# Patient Record
Sex: FEMALE | Race: BLACK OR AFRICAN AMERICAN | NOT HISPANIC OR LATINO | Employment: UNEMPLOYED | URBAN - METROPOLITAN AREA
[De-identification: names, ages, dates, MRNs, and addresses within clinical notes are randomized per-mention and may not be internally consistent; named-entity substitution may affect disease eponyms.]

---

## 2017-04-03 ENCOUNTER — OFFICE VISIT (OUTPATIENT)
Dept: OBSTETRICS AND GYNECOLOGY | Facility: CLINIC | Age: 32
End: 2017-04-03
Payer: MEDICAID

## 2017-04-03 VITALS
BODY MASS INDEX: 20.24 KG/M2 | DIASTOLIC BLOOD PRESSURE: 76 MMHG | WEIGHT: 110 LBS | HEIGHT: 62 IN | SYSTOLIC BLOOD PRESSURE: 120 MMHG

## 2017-04-03 DIAGNOSIS — N64.4 MASTODYNIA OF LEFT BREAST: Primary | ICD-10-CM

## 2017-04-03 PROCEDURE — 99999 PR PBB SHADOW E&M-EST. PATIENT-LVL II: CPT | Mod: PBBFAC,,, | Performed by: OBSTETRICS & GYNECOLOGY

## 2017-04-03 PROCEDURE — 99214 OFFICE O/P EST MOD 30 MIN: CPT | Mod: S$PBB,,, | Performed by: OBSTETRICS & GYNECOLOGY

## 2017-04-03 PROCEDURE — 99212 OFFICE O/P EST SF 10 MIN: CPT | Mod: PBBFAC | Performed by: OBSTETRICS & GYNECOLOGY

## 2017-04-03 RX ORDER — CLOTRIMAZOLE AND BETAMETHASONE DIPROPIONATE 10; .64 MG/G; MG/G
CREAM TOPICAL
Qty: 45 G | Refills: 0 | Status: SHIPPED | OUTPATIENT
Start: 2017-04-03 | End: 2018-04-03

## 2017-04-10 NOTE — PROGRESS NOTES
Subjective:       Patient ID: Lauren Do is a 32 y.o. female.    Chief Complaint:  Breast Mass      History of Present Illness  HPI  Annual Exam-Premenopausal  Patient presents for annual exam. The patient is sexually active. GYN screening history: last pap: approximate date 2016 and was normal. The patient wears seatbelts: no. The patient participates in regular exercise: no. Has the patient ever been transfused or tattooed?: yes. The patient reports that there is not domestic violence in her life.      Pt c/o left breast pain.  Pt has not tried any medication for relief.  Pain has been present only for a few days      GYN & OB History  Patient's last menstrual period was 2017.   Date of Last Pap: 2016    OB History    Para Term  AB SAB TAB Ectopic Multiple Living   4 4 4      0 4      # Outcome Date GA Lbr Justin/2nd Weight Sex Delivery Anes PTL Lv   4 Term 02/10/15 37w3d  2.645 kg (5 lb 13.3 oz) M CS-LTranv Spinal,EPI  Y   3 Term 13 39w3d  3.657 kg (8 lb 1 oz) M CS-LTranv Spinal  Y   2 Term 10/22/08 39w0d  3.459 kg (7 lb 10 oz) M CS-LTranv Spinal N Y   1 Term 06 39w0d  3.43 kg (7 lb 9 oz) F CS-LTranv EPI N Y      Birth Comments: eclampsia           Review of Systems  Review of Systems   Constitutional: Negative.    Respiratory: Negative.    Cardiovascular: Negative.    Gastrointestinal: Negative.    Endocrine: Negative.    Genitourinary: Negative.    Musculoskeletal: Negative.    Hematological: Negative.    Psychiatric/Behavioral: Negative.    Breast: negative.            Objective:    Physical Exam:   Constitutional: She is oriented to person, place, and time. She appears well-developed and well-nourished.    HENT:   Head: Normocephalic and atraumatic.     Neck: Normal range of motion. No tracheal deviation present. No thyromegaly present.    Cardiovascular: Normal rate.     Pulmonary/Chest: Effort normal. No respiratory distress. Right breast exhibits no inverted  nipple, no mass, no nipple discharge, no skin change, no tenderness, presence, no bleeding and no swelling. Left breast exhibits tenderness. Left breast exhibits no inverted nipple, no mass, no nipple discharge, no skin change, presence, no bleeding and no swelling. Breasts are symmetrical.            Abdominal: Soft. She exhibits no distension, no mass and no abdominal incision. There is no tenderness. There is no rebound and no guarding.     Genitourinary: Uterus normal. Pelvic exam was performed with patient supine. There is no rash, tenderness, lesion or injury on the right labia. There is no rash, tenderness, lesion or injury on the left labia. Cervix is normal. Right adnexum displays no mass, no tenderness and no fullness. Left adnexum displays no mass, no tenderness and no fullness. Labial bartholins normal.          Musculoskeletal: Normal range of motion and moves all extremeties.       Neurological: She is alert and oriented to person, place, and time. No cranial nerve deficit. Coordination normal.     Psychiatric: She has a normal mood and affect. Her behavior is normal. Judgment and thought content normal.          Assessment:        1. Mastodynia of left breast     2.  Annual gyn exam        Plan:      1.  Discussed OTC NSAIDs to attempt pain relief.  No masses palpated and location of pain is most likely musculo-skeletal.  2.  Pap smear up to date.

## 2017-05-04 ENCOUNTER — OFFICE VISIT (OUTPATIENT)
Dept: OPTOMETRY | Facility: CLINIC | Age: 32
End: 2017-05-04
Payer: MEDICAID

## 2017-05-04 DIAGNOSIS — H57.11 EYE PAIN, RIGHT: Primary | ICD-10-CM

## 2017-05-04 DIAGNOSIS — Z13.5 SCREENING FOR EYE CONDITION: ICD-10-CM

## 2017-05-04 PROCEDURE — 92002 INTRM OPH EXAM NEW PATIENT: CPT | Mod: S$PBB,,, | Performed by: OPTOMETRIST

## 2017-05-04 PROCEDURE — 99999 PR PBB SHADOW E&M-EST. PATIENT-LVL II: CPT | Mod: PBBFAC,,, | Performed by: OPTOMETRIST

## 2017-05-04 PROCEDURE — 99212 OFFICE O/P EST SF 10 MIN: CPT | Mod: PBBFAC | Performed by: OPTOMETRIST

## 2017-05-04 NOTE — PROGRESS NOTES
HPI     Eye Problem    Additional comments: foreign body sensation in the right eye for   approximately one week.  Vision in that eye seems blurry, but seemed fine   prior to experiencing foreign body sensation in that eye.  No symptoms in   the left eye.            Comments   Patient is in stating she's been having a FB sensation in her OD for the   past week     (+) blurry vision  (+) floaters   (+) flashes of light        Last edited by Edgardo Nuñez, OD on 5/4/2017  3:16 PM. (History)            Assessment /Plan     For exam results, see Encounter Report.    1. Eye pain, right     2. Screening for eye condition                    Ms. Do in today with complaint of ocular discomfort/foreign body sensation in the right eye.   No foreign body noted beneath RUL  Irrigated right eye with RUL everted as precaution.  Ms. Do notes small bump on temporal aspect of RLL, but no bump felt on palpation, although she does report mild tenderness on palpation of temporal RLL.   Possible blocked meibomian gland?  Warm compresses to the temporal aspect of the RLL.  Suggest use of artificial tears (Systane Ultra) in the right eye as needed throughout the day for the next several days.  Return for no charge visit if symptoms not resolved in several days, or prior if any apparent exacerbation of symptoms in the interim.

## 2017-05-04 NOTE — PATIENT INSTRUCTIONS
Ms. Do in today with complaint of ocular discomfort/foreign body sensation in the right eye.   No foreign body noted beneath RUL  Irrigated right eye with RUL everted as precaution.  Ms. Do notes small bump on temporal aspect of RLL, but no bump felt on palpation, although she does report mild tenderness on palpation of temporal RLL.   Possible blocked meibomian gland?  Warm compresses to the temporal aspect of the RLL.  Suggest use of artificial tears (Systane Ultra) in the right eye as needed throughout the day for the next several days.  Return for no charge visit if symptoms not resolved in several days, or prior if any apparent exacerbation of symptoms in the interim.

## 2017-05-04 NOTE — MR AVS SNAPSHOT
[facility] - [facility]  [address]  [facility] [address]  Phone: [phone]  Fax: [phone]                  Lauren Do   2017 1:45 PM   Office Visit    Description:  Female : 1985   Provider:  Edgardo Nuñez OD   Department:  [facility]           Reason for Visit     Eye Problem                To Do List           Goals (5 Years of Data)     None      [facility]     [facility] On Call Nurse Care Line -  Assistance  Unless otherwise directed by your provider, please contact [facility], our nurse care line that is available for  assistance.     Registered nurses in the [facility] On Call Center provide: appointment scheduling, clinical advisement, health education, and other advisory services.  Call: [phone] (toll free)               Medications           Message regarding Medications     Verify the changes and/or additions to your medication regime listed below are the same as discussed with your clinician today.  If any of these changes or additions are incorrect, please notify your healthcare provider.             Verify that the below list of medications is an accurate representation of the medications you are currently taking.  If none reported, the list may be blank. If incorrect, please contact your healthcare provider. Carry this list with you in case of emergency.           Current Medications     clotrimazole-betamethasone 1-0.05% (LOTRISONE) cream Apply to affected area 2 times daily           Clinical Reference Information           Allergies as of 2017     No Known Allergies      Immunizations Administered on Date of Encounter - 2017     None      Instructions    Ms. Do in today with complaint of ocular discomfort/foreign body sensation in the right eye.   No foreign body noted beneath RUL  Irrigated right eye with RUL everted as precaution.  Ms. Do notes small bump on temporal aspect of RLL, but no bump felt on  palpation, although she does report mild tenderness on palpation of temporal RLL.   Possible blocked meibomian gland?  Warm compresses to the temporal aspect of the RLL.  Suggest use of artificial tears (Systane Ultra) in the right eye as needed throughout the day for the next several days.  Return for no charge visit if symptoms not resolved in several days, or prior if any apparent exacerbation of symptoms in the interim.        Language Assistance Services     ATTENTION: Language assistance services are available, free of charge. Please call [phone].      ATENCIÓN: Si priscillala colleen, tiene a pedro disposición servicios gratuitos de asistencia lingüística. Llame al [phone].     SERENITY Ý: N?u b?n nói Ti?ng Vi?t, có các d?ch v? h? tr? ngôn ng? mi?n phí dành cho b?n. G?i s? [phone].         [facility] complies with applicable Federal civil rights laws and does not discriminate on the basis of race, color, national origin, age, disability, or sex.

## 2017-10-11 ENCOUNTER — HOSPITAL ENCOUNTER (EMERGENCY)
Facility: OTHER | Age: 32
Discharge: LEFT BEFORE DISCHARGE | End: 2017-10-11
Attending: EMERGENCY MEDICINE
Payer: MEDICAID

## 2017-10-11 VITALS
OXYGEN SATURATION: 100 % | SYSTOLIC BLOOD PRESSURE: 113 MMHG | HEART RATE: 94 BPM | RESPIRATION RATE: 18 BRPM | TEMPERATURE: 98 F | DIASTOLIC BLOOD PRESSURE: 70 MMHG

## 2017-10-11 DIAGNOSIS — R10.9 ABDOMINAL PAIN, UNSPECIFIED ABDOMINAL LOCATION: Primary | ICD-10-CM

## 2017-10-11 LAB
B-HCG UR QL: NEGATIVE
BILIRUBIN, POC UA: NEGATIVE
BLOOD, POC UA: NEGATIVE
CLARITY, POC UA: ABNORMAL
COLOR, POC UA: YELLOW
CTP QC/QA: YES
GLUCOSE, POC UA: NEGATIVE
KETONES, POC UA: NEGATIVE
LEUKOCYTE EST, POC UA: NEGATIVE
NITRITE, POC UA: NEGATIVE
PH UR STRIP: 7 [PH]
PROTEIN, POC UA: NEGATIVE
SPECIFIC GRAVITY, POC UA: 1.02
UROBILINOGEN, POC UA: 1 E.U./DL

## 2017-10-11 PROCEDURE — 99283 EMERGENCY DEPT VISIT LOW MDM: CPT | Mod: 25

## 2017-10-11 PROCEDURE — 81025 URINE PREGNANCY TEST: CPT | Performed by: EMERGENCY MEDICINE

## 2017-10-11 PROCEDURE — 81003 URINALYSIS AUTO W/O SCOPE: CPT

## 2017-10-11 NOTE — ED NOTES
Pt presented to nurses station and stated she was leaving because no one saw her yet, pt informed 2 RNs and the ED tech has all seen her and she is waiting on MD assessment, pt did not answer this RN, stood in hallway for about two minutes texting on phone and then left facility

## 2017-10-11 NOTE — ED PROVIDER NOTES
Encounter Date: 10/11/2017       History     Chief Complaint   Patient presents with    Abdominal Pain     patient report having abdominal pain/cramping X2 days    Vomiting     patient pretport having N/V X1 day     Nausea     Pt left er after being placed into a room but before I could see her. Triage note and vitals reviewed. Unknown reason for departure.           Review of patient's allergies indicates:  No Known Allergies  Past Medical History:   Diagnosis Date    Anemia 2013     History of anemia during pregnancy    Asthma     childhood    Chronic kidney disease     History of kidney infection during 2 nd pregnancy    Hypertension 2006    Only with 1st pregnancy/eclamptic    Mental disorder 2010    hx of bipolar, anxiety    Seizures 2006    eclampsia     Sickle cell trait     Trauma     At 3 year old, fell and hit arm and head on a lamp.  Needed stitches.      Past Surgical History:   Procedure Laterality Date     SECTION      ,     TUBAL LIGATION       Family History   Problem Relation Age of Onset    Heart disease Mother      Heart Murmur at birth and 13 years old    Arthritis Mother     Asthma Mother 34     Bronchitis    Diabetes Sister     Heart disease Sister      Heart Murmur at birth    Hearing loss Sister     Diabetes Maternal Aunt     Birth defects Maternal Uncle     Mental retardation Maternal Uncle     Diabetes Maternal Grandmother     Heart disease Maternal Grandmother 57     CHF, Heart attack    Hypertension Maternal Grandmother     Arthritis Maternal Grandmother     Asthma Maternal Grandmother     Stroke Maternal Grandmother      Social History   Substance Use Topics    Smoking status: Never Smoker    Smokeless tobacco: Never Used    Alcohol use No      Comment: socially before pregnancy      Review of Systems    Physical Exam     Initial Vitals [10/11/17 1124]   BP Pulse Resp Temp SpO2   113/70 94 18 98.2 °F (36.8 °C) 100 %      MAP       84.33          Physical Exam    ED Course   Procedures  Labs Reviewed   POCT URINE PREGNANCY   POCT URINALYSIS W/O SCOPE                               ED Course      Clinical Impression:   There were no encounter diagnoses.                           Mahogany Sosa MD  10/11/17 2101

## 2017-10-11 NOTE — ED NOTES
PT VERIFIES THAT NAME AND  ARE CORRECT.     LOC: The patient is awake, alert and aware of environment with an appropriate affect, the patient is oriented x 3 and answers all questions appropriately.    APPEARANCE: Patient resting comfortably and in no acute distress. Pt noted to be texting while talking to this RN    SKIN: The skin is warm and dry, color consistent with ethnicity, patient has normal skin turgor and moist mucus membranes, skin intact, no breakdown, brusing or alterations in skin integrity noted.    MUSCULOSKELETAL: Patient moving all extremities well, CMS intact, no obvious swelling, deformity or trauma noted.    RESPIRATORY: Airway is open and patent, trachea is midline, respirations are spontaneous, even and non-labored,      ABDOMEN: bowel sound active x4 quads, no distention noted, reports nausea, denies vomiting, or diarrhea. Denies any intolerance of PO fluids or foods, reports lower abd pain described as crampy    /GI: patient is continent to bowel and bladder. Denies any trouble urinating.  Reports normal bowel patterns    NEUROLOGIC: eyes open spontaneously, behavior appropriate to situation, follows all commands, facial expression symmetrical, purposeful motor response noted,

## 2017-10-31 ENCOUNTER — OFFICE VISIT (OUTPATIENT)
Dept: OBSTETRICS AND GYNECOLOGY | Facility: CLINIC | Age: 32
End: 2017-10-31
Payer: MEDICAID

## 2017-10-31 VITALS
BODY MASS INDEX: 19.32 KG/M2 | SYSTOLIC BLOOD PRESSURE: 102 MMHG | HEIGHT: 62 IN | DIASTOLIC BLOOD PRESSURE: 66 MMHG | WEIGHT: 105 LBS

## 2017-10-31 DIAGNOSIS — N64.4 MASTODYNIA OF LEFT BREAST: Primary | ICD-10-CM

## 2017-10-31 PROCEDURE — 99213 OFFICE O/P EST LOW 20 MIN: CPT | Mod: PBBFAC | Performed by: OBSTETRICS & GYNECOLOGY

## 2017-10-31 PROCEDURE — 99213 OFFICE O/P EST LOW 20 MIN: CPT | Mod: S$PBB,,, | Performed by: OBSTETRICS & GYNECOLOGY

## 2017-10-31 PROCEDURE — 99999 PR PBB SHADOW E&M-EST. PATIENT-LVL III: CPT | Mod: PBBFAC,,, | Performed by: OBSTETRICS & GYNECOLOGY

## 2017-10-31 NOTE — PROGRESS NOTES
"Cc:  Left breast pain    HPI:  32 yor  who has tubal for contraception presents due to continued L breast pain.  Pt was seen 2017 for annual exam (last Pap 2016 WNL) with left breast pain as well; however, pain was in different location.  Pt was in upper, outer quadrant of left breast then, now it is in inferior aspect of L breast.  Pt states she does not work; however, she does have to lift a 4 yro.  Pt states pain is not relieved with Tylenol.  Pt also admits bilateral nipple itching.    Vitals:    10/31/17 0848   BP: 102/66   Weight: 47.6 kg (105 lb)   Height: 5' 2" (1.575 m)     Physical Exam:   Constitutional: She is oriented to person, place, and time. She appears well-developed and well-nourished. No distress.    HENT:   Head: Normocephalic and atraumatic.     Neck: Normal range of motion.    Cardiovascular: Normal rate.     Pulmonary/Chest: Effort normal. Right breast exhibits no inverted nipple, no mass, no nipple discharge, no skin change, no tenderness, presence, no bleeding and no swelling. Left breast exhibits tenderness. Left breast exhibits no inverted nipple, no mass, no nipple discharge, no skin change, presence, no bleeding and no swelling. Breasts are symmetrical.            Abdominal: Soft.                 Neurological: She is alert and oriented to person, place, and time. No cranial nerve deficit. Coordination normal.    Skin: She is not diaphoretic.    Psychiatric: She has a normal mood and affect. Her behavior is normal. Judgment and thought content normal.     Assessment/Plan  1. Mastodynia of left breast  US Breast Left Complete     Discussed OTC Aleve and Motrin for pain  Discussed OTC hydrocortisone 1% cream to nipples prn itching  L breast sono ordered  F/u 2018 for annual exam  "

## 2017-10-31 NOTE — PATIENT INSTRUCTIONS
1.  Use Motrin or Aleve as needed for breast pain    2.  Use Hydrocortisone cream 1% to area that is itching.    3.  A Left breast ultrasound has been ordered, please call [phone] to schedule.

## 2018-01-12 ENCOUNTER — OFFICE VISIT (OUTPATIENT)
Dept: OBSTETRICS AND GYNECOLOGY | Facility: CLINIC | Age: 33
End: 2018-01-12
Payer: MEDICAID

## 2018-01-12 VITALS
BODY MASS INDEX: 19.88 KG/M2 | HEIGHT: 62 IN | SYSTOLIC BLOOD PRESSURE: 106 MMHG | WEIGHT: 108 LBS | DIASTOLIC BLOOD PRESSURE: 60 MMHG

## 2018-01-12 DIAGNOSIS — Z30.09 FAMILY PLANNING COUNSELING: Primary | ICD-10-CM

## 2018-01-12 PROCEDURE — 99212 OFFICE O/P EST SF 10 MIN: CPT | Mod: S$PBB,,, | Performed by: OBSTETRICS & GYNECOLOGY

## 2018-01-12 PROCEDURE — 99212 OFFICE O/P EST SF 10 MIN: CPT | Mod: PBBFAC | Performed by: OBSTETRICS & GYNECOLOGY

## 2018-01-12 PROCEDURE — 99999 PR PBB SHADOW E&M-EST. PATIENT-LVL II: CPT | Mod: PBBFAC,,, | Performed by: OBSTETRICS & GYNECOLOGY

## 2018-01-12 NOTE — PROGRESS NOTES
"Cc:  Discuss tubal reanastomosis    HPI:  32 yr  who had 4 prior Csec and Filshie clip tubal done at time of last Csec (2/10/15) presents to discuss "putting tubes back together" because she desires pregnancy again before she is 37.  Pt states she missed her last menses and desires preg test and proof of neg test.     Vitals:    18 1256   BP: 106/60   Weight: 49 kg (108 lb)   Height: 5' 2" (1.575 m)     Physical Exam   Constitutional: She is oriented to person, place, and time. She appears well-developed and well-nourished. No distress.   HENT:   Head: Normocephalic and atraumatic.   Neck: Normal range of motion.   Cardiovascular: Normal rate.    Pulmonary/Chest: Effort normal.   Abdominal: Soft.   Musculoskeletal: Normal range of motion.   Neurological: She is alert and oriented to person, place, and time.   Skin: She is not diaphoretic.   Psychiatric: She has a normal mood and affect. Her behavior is normal. Judgment and thought content normal.   Vitals reviewed.    UPT neg    20 minute discussion  Copy of Op report given to pt detailing method of tubal ligation (Filshie clip).  Info for Dr. Amanda Littlejohn given.    Assessment  Fertility counseling    Plan  F/u when do for annual or prn.  See discussion  "

## 2018-09-28 ENCOUNTER — TELEPHONE (OUTPATIENT)
Dept: SURGERY | Facility: CLINIC | Age: 33
End: 2018-09-28

## 2018-09-28 NOTE — TELEPHONE ENCOUNTER
Pt notified appt for 10/3/18 [place] [facility] [address]

## 2018-10-03 ENCOUNTER — OFFICE VISIT (OUTPATIENT)
Dept: SURGERY | Facility: CLINIC | Age: 33
End: 2018-10-03
Payer: MEDICAID

## 2018-10-03 DIAGNOSIS — N64.4 BREAST PAIN: Primary | ICD-10-CM

## 2018-10-03 PROCEDURE — 99213 OFFICE O/P EST LOW 20 MIN: CPT | Mod: S$GLB,,, | Performed by: SURGERY

## 2018-10-03 RX ORDER — DULOXETIN HYDROCHLORIDE 20 MG/1
20 CAPSULE, DELAYED RELEASE ORAL DAILY
COMMUNITY

## 2018-10-03 RX ORDER — TRAZODONE HYDROCHLORIDE 100 MG/1
100 TABLET ORAL NIGHTLY
COMMUNITY

## 2018-10-03 NOTE — PROGRESS NOTES
Subjective:       Patient ID: Lauren Do is a 33 y.o. female.    Chief Complaint: Recheck    HPI 34 yo female with breast pain that is persistent with th Vit E  Review of Systems   Constitutional: Negative.    HENT: Negative.    Eyes: Negative.    Respiratory: Negative.    Cardiovascular: Negative.    Gastrointestinal: Negative.    Endocrine: Negative.    Musculoskeletal: Negative.    Skin: Negative.    Allergic/Immunologic: Negative.    Neurological: Negative.    Hematological: Negative.    Psychiatric/Behavioral: Negative.    All other systems reviewed and are negative.      Objective:      Physical Exam   Constitutional: She is oriented to person, place, and time. She appears well-developed and well-nourished.   HENT:   Head: Normocephalic and atraumatic.   Right Ear: External ear normal.   Left Ear: External ear normal.   Nose: Nose normal.   Mouth/Throat: Oropharynx is clear and moist.   Eyes: Conjunctivae and EOM are normal. Pupils are equal, round, and reactive to light.   Neck: Normal range of motion. Neck supple.   Cardiovascular: Normal rate, regular rhythm, normal heart sounds and intact distal pulses.   Pulmonary/Chest: Effort normal and breath sounds normal. Right breast exhibits mass. Right breast exhibits no inverted nipple, no nipple discharge, no skin change and no tenderness. Left breast exhibits no inverted nipple, no mass, no nipple discharge, no skin change and no tenderness.       Abdominal: Soft. Bowel sounds are normal.   Musculoskeletal: Normal range of motion.   Neurological: She is alert and oriented to person, place, and time. She has normal reflexes.   Skin: Skin is warm and dry.   Psychiatric: She has a normal mood and affect. Her behavior is normal. Thought content normal.   Vitals reviewed.      Assessment:       1. Breast pain        Plan:       I will get an US of her right breast and then have her come back to see me

## 2018-10-15 ENCOUNTER — HOSPITAL ENCOUNTER (OUTPATIENT)
Dept: RADIOLOGY | Facility: HOSPITAL | Age: 33
Discharge: HOME | End: 2018-10-15
Attending: SURGERY
Payer: MEDICAID

## 2018-10-15 DIAGNOSIS — N64.4 BREAST PAIN: ICD-10-CM

## 2018-10-15 PROCEDURE — 77065 DX MAMMO INCL CAD UNI: CPT | Mod: TC

## 2018-10-15 PROCEDURE — 76642 ULTRASOUND BREAST LIMITED: CPT | Mod: 26,RT,, | Performed by: RADIOLOGY

## 2018-10-15 PROCEDURE — 76642 ULTRASOUND BREAST LIMITED: CPT | Mod: TC,RT

## 2018-10-15 PROCEDURE — 77061 BREAST TOMOSYNTHESIS UNI: CPT | Mod: TC

## 2018-10-15 PROCEDURE — 77065 DX MAMMO INCL CAD UNI: CPT | Mod: 26,,, | Performed by: RADIOLOGY

## 2018-10-15 PROCEDURE — 77061 BREAST TOMOSYNTHESIS UNI: CPT | Mod: 26,,, | Performed by: RADIOLOGY

## 2019-06-10 ENCOUNTER — OFFICE VISIT (OUTPATIENT)
Dept: OBSTETRICS AND GYNECOLOGY | Facility: CLINIC | Age: 34
End: 2019-06-10
Payer: MEDICAID

## 2019-06-10 VITALS
BODY MASS INDEX: 22.96 KG/M2 | SYSTOLIC BLOOD PRESSURE: 116 MMHG | HEIGHT: 62 IN | DIASTOLIC BLOOD PRESSURE: 84 MMHG | WEIGHT: 124.75 LBS

## 2019-06-10 DIAGNOSIS — N64.4 MASTODYNIA OF RIGHT BREAST: ICD-10-CM

## 2019-06-10 PROCEDURE — 99213 OFFICE O/P EST LOW 20 MIN: CPT | Mod: S$PBB,,, | Performed by: OBSTETRICS & GYNECOLOGY

## 2019-06-10 PROCEDURE — 99999 PR PBB SHADOW E&M-EST. PATIENT-LVL II: ICD-10-PCS | Mod: PBBFAC,,, | Performed by: OBSTETRICS & GYNECOLOGY

## 2019-06-10 PROCEDURE — 99999 PR PBB SHADOW E&M-EST. PATIENT-LVL II: CPT | Mod: PBBFAC,,, | Performed by: OBSTETRICS & GYNECOLOGY

## 2019-06-10 PROCEDURE — 99213 PR OFFICE/OUTPT VISIT, EST, LEVL III, 20-29 MIN: ICD-10-PCS | Mod: S$PBB,,, | Performed by: OBSTETRICS & GYNECOLOGY

## 2019-06-10 PROCEDURE — 99212 OFFICE O/P EST SF 10 MIN: CPT | Mod: PBBFAC | Performed by: OBSTETRICS & GYNECOLOGY

## 2019-07-01 ENCOUNTER — OFFICE VISIT (OUTPATIENT)
Dept: OBSTETRICS AND GYNECOLOGY | Facility: CLINIC | Age: 34
End: 2019-07-01
Payer: MEDICAID

## 2019-07-01 VITALS — WEIGHT: 119.06 LBS | BODY MASS INDEX: 21.77 KG/M2

## 2019-07-01 DIAGNOSIS — N89.8 VAGINAL DISCHARGE: ICD-10-CM

## 2019-07-01 DIAGNOSIS — Z01.419 GYNECOLOGIC EXAM NORMAL: Primary | ICD-10-CM

## 2019-07-01 LAB
BACTERIAL VAGINOSIS DNA: POSITIVE
C TRACH DNA SPEC QL NAA+PROBE: NOT DETECTED
CANDIDA GLABRATA DNA: NEGATIVE
CANDIDA KRUSEI DNA: NEGATIVE
CANDIDA RRNA VAG QL PROBE: POSITIVE
N GONORRHOEA DNA SPEC QL NAA+PROBE: NOT DETECTED
T VAGINALIS RRNA GENITAL QL PROBE: NEGATIVE

## 2019-07-01 PROCEDURE — 99999 PR PBB SHADOW E&M-EST. PATIENT-LVL II: CPT | Mod: PBBFAC,,, | Performed by: OBSTETRICS & GYNECOLOGY

## 2019-07-01 PROCEDURE — 87624 HPV HI-RISK TYP POOLED RSLT: CPT

## 2019-07-01 PROCEDURE — 88175 CYTOPATH C/V AUTO FLUID REDO: CPT

## 2019-07-01 PROCEDURE — 99212 OFFICE O/P EST SF 10 MIN: CPT | Mod: PBBFAC | Performed by: OBSTETRICS & GYNECOLOGY

## 2019-07-01 PROCEDURE — 99999 PR PBB SHADOW E&M-EST. PATIENT-LVL II: ICD-10-PCS | Mod: PBBFAC,,, | Performed by: OBSTETRICS & GYNECOLOGY

## 2019-07-01 PROCEDURE — 87491 CHLMYD TRACH DNA AMP PROBE: CPT

## 2019-07-01 PROCEDURE — 99395 PREV VISIT EST AGE 18-39: CPT | Mod: S$PBB,,, | Performed by: OBSTETRICS & GYNECOLOGY

## 2019-07-01 PROCEDURE — 99395 PR PREVENTIVE VISIT,EST,18-39: ICD-10-PCS | Mod: S$PBB,,, | Performed by: OBSTETRICS & GYNECOLOGY

## 2019-07-01 PROCEDURE — 87480 CANDIDA DNA DIR PROBE: CPT

## 2019-07-01 PROCEDURE — 87510 GARDNER VAG DNA DIR PROBE: CPT

## 2019-07-01 NOTE — PROGRESS NOTES
Subjective:       Patient ID: Lauren Do is a 34 y.o. female.    Chief Complaint:  Well Woman (Pt C/o abnormal mentral/ spotting, last normal Pap 16)      History of Present Illness  HPI  Annual Exam-Premenopausal  Patient presents for annual exam. The patient is sexually active. GYN screening history: last pap: approximate date 2016 and was normal. The patient wears seatbelts: yes. The patient participates in regular exercise: no. Has the patient ever been transfused or tattooed?: yes. The patient reports that there is not domestic violence in her life.    Pt has tubal for contraception.  Pt states she had menses from - and had significant cramping.    Pt to see Dr. Louie on 7/3/19 for R breast pain.          GYN & OB History  Patient's last menstrual period was 2019 (exact date).   Date of Last Pap: 2016    OB History    Para Term  AB Living   4 4 4     4   SAB TAB Ectopic Multiple Live Births         0 4      # Outcome Date GA Lbr Justin/2nd Weight Sex Delivery Anes PTL Lv   4 Term 02/10/15 37w3d  2.645 kg (5 lb 13.3 oz) M CS-LTranv Spinal, EPI  LUZ   3 Term 13 39w3d  3.657 kg (8 lb 1 oz) M CS-LTranv Spinal  LUZ   2 Term 10/22/08 39w0d  3.459 kg (7 lb 10 oz) M CS-LTranv Spinal N LUZ   1 Term 06 39w0d  3.43 kg (7 lb 9 oz) F CS-LTranv EPI N LUZ      Birth Comments: eclampsia        Review of Systems  Review of Systems   Constitutional: Negative.    Eyes: Negative.    Respiratory: Negative.    Cardiovascular: Negative.    Gastrointestinal: Negative.    Genitourinary: Positive for vaginal discharge. Negative for bladder incontinence, decreased libido, dysmenorrhea, dyspareunia, dysuria, flank pain, frequency, genital sores, menorrhagia, menstrual problem, pelvic pain, vaginal bleeding, vaginal pain, vaginal dryness and vaginal odor.   Musculoskeletal: Negative.    Integumentary:  Positive for breast tenderness. Negative for breast mass, nipple discharge and  breast skin changes.   Neurological: Negative.    Hematological: Negative.    Psychiatric/Behavioral: Negative.    Breast: Positive for mastodynia and tenderness.Negative for asymmetry, breast self exam, lump, mass, nipple discharge and skin changes        Sx's only in R breast                 Objective:    Physical Exam:   Constitutional: She is oriented to person, place, and time. She appears well-developed and well-nourished. No distress.    HENT:   Head: Normocephalic and atraumatic.     Neck: Normal range of motion.    Cardiovascular: Normal rate.     Pulmonary/Chest: Effort normal. No respiratory distress. Right breast exhibits tenderness. Right breast exhibits no inverted nipple, no mass, no nipple discharge, no skin change, presence, no bleeding and no swelling. Left breast exhibits no inverted nipple, no mass, no nipple discharge, no skin change, no tenderness, presence, no bleeding and no swelling.            Abdominal: Soft. She exhibits no distension and no mass. There is no tenderness. There is no rebound and no guarding.     Genitourinary: Vagina normal and uterus normal. Pelvic exam was performed with patient supine. There is no rash, tenderness, lesion or injury on the right labia. There is no rash, tenderness, lesion or injury on the left labia. Cervix is normal. Right adnexum displays no mass, no tenderness and no fullness. Left adnexum displays no mass, no tenderness and no fullness.           Musculoskeletal: Normal range of motion and moves all extremeties. She exhibits no tenderness.       Neurological: She is alert and oriented to person, place, and time. No cranial nerve deficit. Coordination normal.    Skin: She is not diaphoretic.    Psychiatric: She has a normal mood and affect. Her behavior is normal. Judgment and thought content normal.          Assessment:        1. Gynecologic exam normal    2. Vaginal discharge               Plan:      1.  Pap, HR HPV, AFFIRM and GC/CT collected  2.   Pt to f/u with Dr Louie 7/3/19 for breast evaluation

## 2019-07-02 ENCOUNTER — TELEPHONE (OUTPATIENT)
Dept: OBSTETRICS AND GYNECOLOGY | Facility: HOSPITAL | Age: 34
End: 2019-07-02

## 2019-07-02 DIAGNOSIS — B37.31 YEAST VAGINITIS: Primary | ICD-10-CM

## 2019-07-02 DIAGNOSIS — N76.0 BACTERIAL VAGINOSIS: ICD-10-CM

## 2019-07-02 DIAGNOSIS — B96.89 BACTERIAL VAGINOSIS: ICD-10-CM

## 2019-07-02 RX ORDER — FLUCONAZOLE 150 MG/1
150 TABLET ORAL DAILY
Qty: 1 TABLET | Refills: 0 | Status: SHIPPED | OUTPATIENT
Start: 2019-07-02 | End: 2019-07-03

## 2019-07-02 RX ORDER — METRONIDAZOLE 500 MG/1
500 TABLET ORAL EVERY 12 HOURS
Qty: 14 TABLET | Refills: 0 | Status: SHIPPED | OUTPATIENT
Start: 2019-07-02 | End: 2019-07-09

## 2019-07-03 LAB
HPV HR 12 DNA CVX QL NAA+PROBE: NEGATIVE
HPV16 AG SPEC QL: NEGATIVE
HPV18 DNA SPEC QL NAA+PROBE: NEGATIVE

## 2019-07-06 PROBLEM — N64.4 MASTODYNIA OF RIGHT BREAST: Status: ACTIVE | Noted: 2019-07-06

## 2019-11-13 ENCOUNTER — HOSPITAL ENCOUNTER (EMERGENCY)
Facility: HOSPITAL | Age: 34
Discharge: HOME | End: 2019-11-13
Attending: EMERGENCY MEDICINE
Payer: MEDICAID

## 2019-11-13 VITALS
HEIGHT: 62 IN | RESPIRATION RATE: 18 BRPM | DIASTOLIC BLOOD PRESSURE: 70 MMHG | TEMPERATURE: 99 F | SYSTOLIC BLOOD PRESSURE: 123 MMHG | HEART RATE: 102 BPM | OXYGEN SATURATION: 100 % | BODY MASS INDEX: 19.88 KG/M2 | WEIGHT: 108 LBS

## 2019-11-13 DIAGNOSIS — J02.9 PHARYNGITIS, UNSPECIFIED ETIOLOGY: Primary | ICD-10-CM

## 2019-11-13 DIAGNOSIS — K08.89 PAIN, DENTAL: ICD-10-CM

## 2019-11-13 LAB
B-HCG UR QL: NEGATIVE
CTP QC/QA: YES
POC MOLECULAR INFLUENZA A AGN: NEGATIVE
POC MOLECULAR INFLUENZA B AGN: NEGATIVE
S PYO RRNA THROAT QL PROBE: NEGATIVE

## 2019-11-13 PROCEDURE — 63600175 PHARM REV CODE 636 W HCPCS: Mod: ER | Performed by: PHYSICIAN ASSISTANT

## 2019-11-13 PROCEDURE — 96372 THER/PROPH/DIAG INJ SC/IM: CPT | Mod: ER

## 2019-11-13 PROCEDURE — 81025 URINE PREGNANCY TEST: CPT | Mod: ER | Performed by: EMERGENCY MEDICINE

## 2019-11-13 PROCEDURE — 99284 EMERGENCY DEPT VISIT MOD MDM: CPT | Mod: 25,ER

## 2019-11-13 PROCEDURE — 25000003 PHARM REV CODE 250: Mod: ER | Performed by: PHYSICIAN ASSISTANT

## 2019-11-13 PROCEDURE — 87880 STREP A ASSAY W/OPTIC: CPT | Mod: ER

## 2019-11-13 PROCEDURE — 87081 CULTURE SCREEN ONLY: CPT

## 2019-11-13 PROCEDURE — 87804 INFLUENZA ASSAY W/OPTIC: CPT | Mod: ER

## 2019-11-13 RX ORDER — PENICILLIN V POTASSIUM 250 MG/1
500 TABLET, FILM COATED ORAL
Status: COMPLETED | OUTPATIENT
Start: 2019-11-13 | End: 2019-11-13

## 2019-11-13 RX ORDER — KETOROLAC TROMETHAMINE 30 MG/ML
15 INJECTION, SOLUTION INTRAMUSCULAR; INTRAVENOUS
Status: COMPLETED | OUTPATIENT
Start: 2019-11-13 | End: 2019-11-13

## 2019-11-13 RX ORDER — CHLORHEXIDINE GLUCONATE ORAL RINSE 1.2 MG/ML
15 SOLUTION DENTAL 2 TIMES DAILY
Refills: 0 | COMMUNITY
Start: 2019-11-13 | End: 2019-11-27

## 2019-11-13 RX ORDER — IBUPROFEN 600 MG/1
600 TABLET ORAL EVERY 6 HOURS PRN
Qty: 20 TABLET | Refills: 0 | Status: SHIPPED | OUTPATIENT
Start: 2019-11-13

## 2019-11-13 RX ORDER — PENICILLIN V POTASSIUM 500 MG/1
500 TABLET, FILM COATED ORAL 4 TIMES DAILY
Qty: 28 TABLET | Refills: 0 | Status: SHIPPED | OUTPATIENT
Start: 2019-11-13 | End: 2019-11-20

## 2019-11-13 RX ORDER — FAMOTIDINE 20 MG/1
20 TABLET, FILM COATED ORAL 2 TIMES DAILY
Qty: 10 TABLET | Refills: 0 | Status: SHIPPED | OUTPATIENT
Start: 2019-11-13 | End: 2020-11-12

## 2019-11-13 RX ADMIN — KETOROLAC TROMETHAMINE 15 MG: 30 INJECTION, SOLUTION INTRAMUSCULAR; INTRAVENOUS at 07:11

## 2019-11-13 RX ADMIN — PENICILLIN V POTASSIUM 500 MG: 250 TABLET, FILM COATED ORAL at 07:11

## 2019-11-14 NOTE — ED PROVIDER NOTES
Encounter Date: 2019       History     Chief Complaint   Patient presents with    Generalized Body Aches     Body aches, throat pain, and feeling hot x1 week     34-year-old female complains of dental pain and sore throat x2 days.  She also reports chills and lower back pain associated.  She denies difficulty swallowing, or pain with neck movement.         Review of patient's allergies indicates:  No Known Allergies  Past Medical History:   Diagnosis Date    Anemia 2013     History of anemia during pregnancy    Asthma     childhood    Chronic kidney disease     History of kidney infection during 2 nd pregnancy    Hypertension 2006    Only with 1st pregnancy/eclamptic    Mental disorder 2010    hx of bipolar, anxiety    Seizures 2006    eclampsia     Sickle cell trait     Trauma     At 3 year old, fell and hit arm and head on a lamp.  Needed stitches.      Past Surgical History:   Procedure Laterality Date     SECTION      ,     TUBAL LIGATION       Family History   Problem Relation Age of Onset    Heart disease Mother         Heart Murmur at birth and 13 years old    Arthritis Mother     Asthma Mother 34        Bronchitis    Diabetes Sister     Heart disease Sister         Heart Murmur at birth    Hearing loss Sister     Diabetes Maternal Aunt     Birth defects Maternal Uncle     Mental retardation Maternal Uncle     Diabetes Maternal Grandmother     Heart disease Maternal Grandmother 57        CHF, Heart attack    Hypertension Maternal Grandmother     Arthritis Maternal Grandmother     Asthma Maternal Grandmother     Stroke Maternal Grandmother     Breast cancer Maternal Grandmother      Social History     Tobacco Use    Smoking status: Never Smoker    Smokeless tobacco: Never Used   Substance Use Topics    Alcohol use: No    Drug use: No     Review of Systems   Constitutional: Positive for chills. Negative for fever.   HENT: Positive for dental problem and  sore throat. Negative for trouble swallowing.    Respiratory: Negative for cough and shortness of breath.    Cardiovascular: Negative for chest pain.   Gastrointestinal: Negative for nausea.   Genitourinary: Negative for dysuria.   Musculoskeletal: Positive for back pain.   Skin: Negative for rash.   Neurological: Negative for weakness.   Hematological: Does not bruise/bleed easily.       Physical Exam     Initial Vitals [11/13/19 1821]   BP Pulse Resp Temp SpO2   126/75 (!) 116 18 99.3 °F (37.4 °C) 99 %      MAP       --         Physical Exam    Vitals reviewed.  Constitutional: She appears well-developed and well-nourished. She is not diaphoretic. No distress.   HENT:   Head: Normocephalic and atraumatic.   Right Ear: External ear normal.   Left Ear: External ear normal.   Nose: Nose normal.   Mouth/Throat: Mucous membranes are normal. No trismus in the jaw. Dental caries present. No dental abscesses. No oropharyngeal exudate, posterior oropharyngeal edema, posterior oropharyngeal erythema or tonsillar abscesses.   Severe dental caries throughout.  Gingival tenderness below right lower incisors.    Eyes: Conjunctivae are normal. No scleral icterus.   Neck: Trachea normal, normal range of motion, full passive range of motion without pain and phonation normal. Neck supple. No thyroid mass and no thyromegaly present. Carotid bruit is not present.   Mild left submandibular tenderness.  No induration, edema, or erythema.  Full range of motion without pain.   Cardiovascular: Normal rate, regular rhythm, normal heart sounds and intact distal pulses.   Pulmonary/Chest: Breath sounds normal. No respiratory distress. She has no wheezes. She has no rhonchi. She has no rales. She exhibits no tenderness.   Musculoskeletal: Normal range of motion.   Lymphadenopathy:     She has no cervical adenopathy.   Neurological: She is alert and oriented to person, place, and time.   Skin: Skin is warm and dry. No rash noted. No erythema.          ED Course   Procedures  Labs Reviewed   CULTURE, STREP A,  THROAT   POCT URINE PREGNANCY   POCT INFLUENZA A/B MOLECULAR   POCT RAPID STREP A          Imaging Results    None          Medical Decision Making:   ED Management:  This patient presents to the Emergency Department for dental pain x2 days.  She also reports sore throat, mostly on the left side.  The patient reports no fever, difficulty breathing, or inability to open mouth. Exam shows a non-toxic, afebrile, and well appearing female with slight tachycardia, otherwise reassuring vital signs. There is no identified oral drainable abscess at this time.  No facial cellulitis, airway compromise, sublingual swelling/elevation, or trismus. Neck spaces are soft. I considered, but at this time, do not suspect Wilian's angina, deep space infection, peritonsillar infection, mastoiditis, otitis externa, or acute vascular pathology. There is tenderness to the gingiva surrounding the 25 tooth, with pain to percussion in severe dental caries.  Rapid strep negative.  Patient treated with antibiotics and analgesics, and instructed to follow-up ASAP with dentist for further evaluation.  Resources for dentist were provided.  Patient is safe for discharge. Return precautions given.  All questions answered.  Patient verbalized understanding and agreed with plan.                                   Clinical Impression:       ICD-10-CM ICD-9-CM   1. Pharyngitis, unspecified etiology J02.9 462   2. Pain, dental K08.89 525.9                             Tonny Maciel PA-C  11/13/19 2040

## 2019-11-15 LAB — BACTERIA THROAT CULT: NORMAL
